# Patient Record
Sex: MALE | Race: BLACK OR AFRICAN AMERICAN | Employment: FULL TIME | URBAN - METROPOLITAN AREA
[De-identification: names, ages, dates, MRNs, and addresses within clinical notes are randomized per-mention and may not be internally consistent; named-entity substitution may affect disease eponyms.]

---

## 2017-05-08 ENCOUNTER — HOSPITAL ENCOUNTER (EMERGENCY)
Age: 32
Discharge: HOME | End: 2017-05-08
Payer: SELF-PAY

## 2017-05-08 VITALS
RESPIRATION RATE: 16 BRPM | DIASTOLIC BLOOD PRESSURE: 78 MMHG | HEART RATE: 78 BPM | TEMPERATURE: 97.9 F | SYSTOLIC BLOOD PRESSURE: 132 MMHG
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
## 2017-05-08 PROCEDURE — 99282 EMERGENCY DEPT VISIT SF MDM: CPT | Performed by: PHYSICIAN ASSISTANT

## 2017-05-08 RX ORDER — TRIAMCINOLONE ACETONIDE 1 MG/G
OINTMENT TOPICAL 2 TIMES DAILY
Qty: 30 G | Refills: 1 | Status: SHIPPED | OUTPATIENT
Start: 2017-05-08

## 2017-05-08 RX ORDER — PREDNISONE 50 MG/1
50 TABLET ORAL DAILY
Qty: 3 TAB | Refills: 0 | Status: SHIPPED | OUTPATIENT
Start: 2017-05-08 | End: 2017-05-11

## 2017-05-08 NOTE — ED TRIAGE NOTES
Pt complains of a rash. Pt states that the rash appeared yesterday. Pt denies any other symptoms or complaints.

## 2017-05-08 NOTE — DISCHARGE INSTRUCTIONS
Insect Stings and Bites: Care Instructions  Your Care Instructions  Stings and bites from bees, wasps, ants, and other insects often cause pain, swelling, redness, and itching. In some people, especially children, the redness and swelling may be worse. It may extend several inches beyond the affected area. But in most cases, stings and bites don't cause reactions all over the body. If you have had a reaction to an insect sting or bite, you are at risk for a reaction if you get stung or bitten again. Follow-up care is a key part of your treatment and safety. Be sure to make and go to all appointments, and call your doctor if you are having problems. It's also a good idea to know your test results and keep a list of the medicines you take. How can you care for yourself at home? · Do not scratch or rub the skin where the sting or bite occurred. · Put a cold pack or ice cube on the area. Put a thin cloth between the ice and your skin. For some people, a paste of baking soda mixed with a little water helps relieve pain and decrease the reaction. · Take an over-the-counter antihistamine, such as diphenhydramine (Benadryl) or loratadine (Claritin), to relieve swelling, redness, and itching. Calamine lotion or hydrocortisone cream may also help. Do not give antihistamines to your child unless you have checked with the doctor first.  · Be safe with medicines. If your doctor prescribed medicine for your allergy, take it exactly as prescribed. Call your doctor if you think you are having a problem with your medicine. You will get more details on the specific medicines your doctor prescribes. · Your doctor may prescribe a shot of epinephrine to carry with you in case you have a severe reaction. Learn how and when to give yourself the shot, and keep it with you at all times. Make sure it has not . · Go to the emergency room anytime you have a severe reaction.  Go even if you have given yourself epinephrine and are feeling better. Symptoms can come back. When should you call for help? Call 911 anytime you think you may need emergency care. For example, call if:  · You have symptoms of a severe allergic reaction. These may include:  ¨ Sudden raised, red areas (hives) all over your body. ¨ Swelling of the throat, mouth, lips, or tongue. ¨ Trouble breathing. ¨ Passing out (losing consciousness). Or you may feel very lightheaded or suddenly feel weak, confused, or restless. Call your doctor now or seek immediate medical care if:  · You have symptoms of an allergic reaction not right at the sting or bite, such as:  ¨ A rash or small area of hives (raised, red areas on the skin). ¨ Itching. ¨ Swelling. ¨ Belly pain, nausea, or vomiting. · You have a lot of swelling around the site (such as your entire arm or leg is swollen). · You have signs of infection, such as:  ¨ Increased pain, swelling, redness, or warmth around the sting. ¨ Red streaks leading from the area. ¨ Pus draining from the sting. ¨ A fever. Watch closely for changes in your health, and be sure to contact your doctor if:  · You do not get better as expected. Where can you learn more? Go to http://[facility].info/. Enter P390 in the search box to learn more about \"Insect Stings and Bites: Care Instructions. \"  Current as of: July 28, 2016  Content Version: 11.2  © 9898-4300 [employer]. Care instructions adapted under license by [employer] (which disclaims liability or warranty for this information). If you have questions about a medical condition or this instruction, always ask your healthcare professional. [place] [address] any warranty or liability for your use of this information.

## 2017-05-08 NOTE — ED PROVIDER NOTES
Patient is a 27 y.o. male presenting with skin problem. The history is provided by the patient. Skin Problem    This is a new problem. The current episode started yesterday. The problem has been gradually worsening. The problem is associated with an unknown factor. There has been no fever. Affected Location: generalized body  The patient is experiencing no pain. Associated symptoms include itching. He has tried nothing for the symptoms. The treatment provided no relief. Past Medical History:   Diagnosis Date    Ulcer [facility])        History reviewed. No pertinent surgical history. History reviewed. No pertinent family history. Social History     Social History    Marital status: SINGLE     Spouse name: N/A    Number of children: N/A    Years of education: N/A     Occupational History    Not on file. Social History Main Topics    Smoking status: Not on file    Smokeless tobacco: Not on file    Alcohol use Not on file    Drug use: Not on file    Sexual activity: Not on file     Other Topics Concern    Not on file     Social History Narrative    No narrative on file         ALLERGIES: Review of patient's allergies indicates no known allergies. Review of Systems   Skin: Positive for itching. All other systems reviewed and are negative. There were no vitals filed for this visit. Physical Exam   Constitutional: He is oriented to person, place, and time. He appears well-developed and well-nourished. No distress. HENT:   Head: Normocephalic and atraumatic. Eyes: Conjunctivae and EOM are normal. Pupils are equal, round, and reactive to light. Neck: Normal range of motion. Neck supple. Cardiovascular: Normal rate and regular rhythm. Pulmonary/Chest: Effort normal and breath sounds normal. No respiratory distress. He has no wheezes. Abdominal: Soft. Bowel sounds are normal.   Musculoskeletal: He exhibits no edema.    Neurological: He is alert and oriented to person, place, and time. Skin: Skin is warm. Rash noted. Flesh colored lesions to head neck arms and legs, no open areas, no hives    Nursing note and vitals reviewed.        MDM  Number of Diagnoses or Management Options  Diagnosis management comments: Probable insect bites/bed bug bites   Advised to throw away mattress and box springs,use anti itch cream   Work note given     Risk of Complications, Morbidity, and/or Mortality  Presenting problems: low  Diagnostic procedures: low  Management options: low    Patient Progress  Patient progress: improved    ED Course       Procedures